# Patient Record
Sex: MALE | Race: WHITE | NOT HISPANIC OR LATINO | URBAN - NONMETROPOLITAN AREA
[De-identification: names, ages, dates, MRNs, and addresses within clinical notes are randomized per-mention and may not be internally consistent; named-entity substitution may affect disease eponyms.]

---

## 2017-08-16 ENCOUNTER — IMPORTED ENCOUNTER (OUTPATIENT)
Dept: URBAN - NONMETROPOLITAN AREA CLINIC 1 | Facility: CLINIC | Age: 57
End: 2017-08-16

## 2017-08-16 PROBLEM — H52.4: Noted: 2017-08-16

## 2017-08-16 PROBLEM — H52.223: Noted: 2017-08-16

## 2017-08-16 PROBLEM — H25.13: Noted: 2021-02-15

## 2017-08-16 PROCEDURE — 92015 DETERMINE REFRACTIVE STATE: CPT

## 2017-08-16 PROCEDURE — 92004 COMPRE OPH EXAM NEW PT 1/>: CPT

## 2017-08-16 NOTE — PATIENT DISCUSSION
Astig/Presby-  discussed refractive status in detail w/ pt today-  new GLRx given-  monitor yearly or PRN; 's Notes: MR  8/16/17DFE  [employer] screening  8/16/17

## 2018-08-24 ENCOUNTER — IMPORTED ENCOUNTER (OUTPATIENT)
Dept: URBAN - NONMETROPOLITAN AREA CLINIC 1 | Facility: CLINIC | Age: 58
End: 2018-08-24

## 2018-08-24 PROCEDURE — 92014 COMPRE OPH EXAM EST PT 1/>: CPT

## 2018-08-24 PROCEDURE — 92015 DETERMINE REFRACTIVE STATE: CPT

## 2018-08-24 NOTE — PATIENT DISCUSSION
Simple Astigmatism w/Presbyopia OU-  discussed refractive status in detail w/ pt today-  new spectacle Rx issued-  monitor yearly or PRN; 's Notes: MR  8/24/2018DFE  deferred[employer] screening  8/16/17

## 2019-08-29 ENCOUNTER — IMPORTED ENCOUNTER (OUTPATIENT)
Dept: URBAN - NONMETROPOLITAN AREA CLINIC 1 | Facility: CLINIC | Age: 59
End: 2019-08-29

## 2019-08-29 PROCEDURE — 92015 DETERMINE REFRACTIVE STATE: CPT

## 2019-08-29 PROCEDURE — 92014 COMPRE OPH EXAM EST PT 1/>: CPT

## 2021-02-15 ENCOUNTER — IMPORTED ENCOUNTER (OUTPATIENT)
Dept: URBAN - NONMETROPOLITAN AREA CLINIC 1 | Facility: CLINIC | Age: 61
End: 2021-02-15

## 2021-02-15 PROCEDURE — 92014 COMPRE OPH EXAM EST PT 1/>: CPT

## 2021-02-15 PROCEDURE — 92015 DETERMINE REFRACTIVE STATE: CPT

## 2021-02-15 NOTE — PATIENT DISCUSSION
Simple Astigmatism w/Presbyopia OU-  Discussed refractive status in detail w/ pt today-  New spectacle Rx issued-  Monitor yearly or PRN Shruthi OU- Discussed diagnosis in detail with patient- Discussed signs and symptoms of progression- Discussed UV protection- No treatment needed at this time - Continue to monitor; 's Notes: MR  8/24/2018DFE  deferred[employer] screening  8/16/17

## 2022-02-12 ASSESSMENT — TONOMETRY
OS_IOP_MMHG: 18
OD_IOP_MMHG: 18
OS_IOP_MMHG: 17
OD_IOP_MMHG: 19

## 2022-02-12 ASSESSMENT — VISUAL ACUITY
OU_CC: J1+
OD_SC: 20/20
OS_CC: J1+
OD_CC: J1+
OS_SC: 20/20
OS_SC: 20/20
OD_SC: 20/20

## 2022-02-17 ENCOUNTER — ESTABLISHED PATIENT (OUTPATIENT)
Dept: URBAN - NONMETROPOLITAN AREA CLINIC 1 | Facility: CLINIC | Age: 62
End: 2022-02-17

## 2022-02-17 DIAGNOSIS — H52.4: ICD-10-CM

## 2022-02-17 PROCEDURE — 92015 DETERMINE REFRACTIVE STATE: CPT

## 2022-02-17 PROCEDURE — 92014 COMPRE OPH EXAM EST PT 1/>: CPT

## 2022-02-17 ASSESSMENT — VISUAL ACUITY
OD_CC: 20/20
OS_CC: 20/20

## 2022-02-17 ASSESSMENT — TONOMETRY
OS_IOP_MMHG: 13
OD_IOP_MMHG: 13

## 2022-04-09 ASSESSMENT — TONOMETRY
OD_IOP_MMHG: 17
OS_IOP_MMHG: 17
OD_IOP_MMHG: 17
OS_IOP_MMHG: 17

## 2022-04-09 ASSESSMENT — VISUAL ACUITY
OD_SC: 20/20
OU_CC: 20/20
OS_SC: 20/20
OS_SC: 20/20
OD_SC: 20/20